# Patient Record
Sex: FEMALE | Race: WHITE | Employment: FULL TIME | URBAN - METROPOLITAN AREA
[De-identification: names, ages, dates, MRNs, and addresses within clinical notes are randomized per-mention and may not be internally consistent; named-entity substitution may affect disease eponyms.]

---

## 2021-03-23 ENCOUNTER — HOSPITAL ENCOUNTER (INPATIENT)
Facility: CLINIC | Age: 26
LOS: 2 days | Discharge: HOME | DRG: 818 | End: 2021-03-26
Attending: EMERGENCY MEDICINE | Admitting: OBSTETRICS & GYNECOLOGY
Payer: COMMERCIAL

## 2021-03-23 DIAGNOSIS — R19.00 PELVIC MASS: ICD-10-CM

## 2021-03-23 PROCEDURE — 96376 TX/PRO/DX INJ SAME DRUG ADON: CPT

## 2021-03-23 PROCEDURE — C9803 HOPD COVID-19 SPEC COLLECT: HCPCS

## 2021-03-23 PROCEDURE — 99285 EMERGENCY DEPT VISIT HI MDM: CPT | Mod: 25

## 2021-03-23 PROCEDURE — 96374 THER/PROPH/DIAG INJ IV PUSH: CPT

## 2021-03-23 PROCEDURE — 96361 HYDRATE IV INFUSION ADD-ON: CPT

## 2021-03-24 ENCOUNTER — ANESTHESIA EVENT (OUTPATIENT)
Dept: SURGERY | Facility: CLINIC | Age: 26
DRG: 818 | End: 2021-03-24
Payer: COMMERCIAL

## 2021-03-24 ENCOUNTER — APPOINTMENT (OUTPATIENT)
Dept: ULTRASOUND IMAGING | Facility: CLINIC | Age: 26
DRG: 818 | End: 2021-03-24
Attending: EMERGENCY MEDICINE
Payer: COMMERCIAL

## 2021-03-24 ENCOUNTER — APPOINTMENT (OUTPATIENT)
Dept: ULTRASOUND IMAGING | Facility: CLINIC | Age: 26
DRG: 818 | End: 2021-03-24
Attending: OBSTETRICS & GYNECOLOGY
Payer: COMMERCIAL

## 2021-03-24 ENCOUNTER — ANESTHESIA (OUTPATIENT)
Dept: SURGERY | Facility: CLINIC | Age: 26
DRG: 818 | End: 2021-03-24
Payer: COMMERCIAL

## 2021-03-24 ENCOUNTER — MEDICAL CORRESPONDENCE (OUTPATIENT)
Dept: HEALTH INFORMATION MANAGEMENT | Facility: CLINIC | Age: 26
End: 2021-03-24

## 2021-03-24 PROBLEM — R19.00 PELVIC MASS: Status: ACTIVE | Noted: 2021-03-24

## 2021-03-24 LAB
ABO + RH BLD: NORMAL
ABO + RH BLD: NORMAL
ALBUMIN SERPL-MCNC: 3.3 G/DL (ref 3.4–5)
ALBUMIN UR-MCNC: NEGATIVE MG/DL
ALP SERPL-CCNC: 49 U/L (ref 40–150)
ALT SERPL W P-5'-P-CCNC: 20 U/L (ref 0–50)
ANION GAP SERPL CALCULATED.3IONS-SCNC: 5 MMOL/L (ref 3–14)
APPEARANCE UR: ABNORMAL
AST SERPL W P-5'-P-CCNC: 10 U/L (ref 0–45)
BACTERIA #/AREA URNS HPF: ABNORMAL /HPF
BASOPHILS # BLD AUTO: 0 10E9/L (ref 0–0.2)
BASOPHILS NFR BLD AUTO: 0.2 %
BILIRUB SERPL-MCNC: 0.7 MG/DL (ref 0.2–1.3)
BILIRUB UR QL STRIP: NEGATIVE
BLD GP AB SCN SERPL QL: NORMAL
BLOOD BANK CMNT PATIENT-IMP: NORMAL
BUN SERPL-MCNC: 5 MG/DL (ref 7–30)
CALCIUM SERPL-MCNC: 8.6 MG/DL (ref 8.5–10.1)
CHLORIDE SERPL-SCNC: 104 MMOL/L (ref 94–109)
CO2 SERPL-SCNC: 23 MMOL/L (ref 20–32)
COLOR UR AUTO: ABNORMAL
CREAT SERPL-MCNC: 0.55 MG/DL (ref 0.52–1.04)
DIFFERENTIAL METHOD BLD: ABNORMAL
EOSINOPHIL # BLD AUTO: 0 10E9/L (ref 0–0.7)
EOSINOPHIL NFR BLD AUTO: 0 %
ERYTHROCYTE [DISTWIDTH] IN BLOOD BY AUTOMATED COUNT: 12.6 % (ref 10–15)
GFR SERPL CREATININE-BSD FRML MDRD: >90 ML/MIN/{1.73_M2}
GLUCOSE SERPL-MCNC: 107 MG/DL (ref 70–99)
GLUCOSE UR STRIP-MCNC: NEGATIVE MG/DL
HCT VFR BLD AUTO: 28.9 % (ref 35–47)
HGB BLD-MCNC: 9.4 G/DL (ref 11.7–15.7)
HGB UR QL STRIP: NEGATIVE
IMM GRANULOCYTES # BLD: 0.1 10E9/L (ref 0–0.4)
IMM GRANULOCYTES NFR BLD: 0.5 %
KETONES UR STRIP-MCNC: 40 MG/DL
LABORATORY COMMENT REPORT: NORMAL
LEUKOCYTE ESTERASE UR QL STRIP: ABNORMAL
LYMPHOCYTES # BLD AUTO: 0.9 10E9/L (ref 0.8–5.3)
LYMPHOCYTES NFR BLD AUTO: 8.1 %
MCH RBC QN AUTO: 28.9 PG (ref 26.5–33)
MCHC RBC AUTO-ENTMCNC: 32.5 G/DL (ref 31.5–36.5)
MCV RBC AUTO: 89 FL (ref 78–100)
MONOCYTES # BLD AUTO: 0.6 10E9/L (ref 0–1.3)
MONOCYTES NFR BLD AUTO: 5.6 %
NEUTROPHILS # BLD AUTO: 9.2 10E9/L (ref 1.6–8.3)
NEUTROPHILS NFR BLD AUTO: 85.6 %
NITRATE UR QL: NEGATIVE
NRBC # BLD AUTO: 0 10*3/UL
NRBC BLD AUTO-RTO: 0 /100
PH UR STRIP: 5.5 PH (ref 5–7)
PLATELET # BLD AUTO: 157 10E9/L (ref 150–450)
POTASSIUM SERPL-SCNC: 3.3 MMOL/L (ref 3.4–5.3)
PROT SERPL-MCNC: 7.1 G/DL (ref 6.8–8.8)
RBC # BLD AUTO: 3.25 10E12/L (ref 3.8–5.2)
RBC #/AREA URNS AUTO: 3 /HPF (ref 0–2)
SARS-COV-2 RNA RESP QL NAA+PROBE: NEGATIVE
SODIUM SERPL-SCNC: 132 MMOL/L (ref 133–144)
SOURCE: ABNORMAL
SP GR UR STRIP: 1 (ref 1–1.03)
SPECIMEN EXP DATE BLD: NORMAL
SPECIMEN SOURCE: NORMAL
SQUAMOUS #/AREA URNS AUTO: 3 /HPF (ref 0–1)
UROBILINOGEN UR STRIP-MCNC: 0 MG/DL (ref 0–2)
WBC # BLD AUTO: 10.8 10E9/L (ref 4–11)
WBC #/AREA URNS AUTO: 10 /HPF (ref 0–5)

## 2021-03-24 PROCEDURE — 0UT00ZZ RESECTION OF RIGHT OVARY, OPEN APPROACH: ICD-10-PCS | Performed by: OBSTETRICS & GYNECOLOGY

## 2021-03-24 PROCEDURE — 85025 COMPLETE CBC W/AUTO DIFF WBC: CPT | Performed by: EMERGENCY MEDICINE

## 2021-03-24 PROCEDURE — 370N000017 HC ANESTHESIA TECHNICAL FEE, PER MIN: Performed by: OBSTETRICS & GYNECOLOGY

## 2021-03-24 PROCEDURE — 250N000009 HC RX 250: Performed by: NURSE ANESTHETIST, CERTIFIED REGISTERED

## 2021-03-24 PROCEDURE — 120N000001 HC R&B MED SURG/OB

## 2021-03-24 PROCEDURE — 76801 OB US < 14 WKS SINGLE FETUS: CPT

## 2021-03-24 PROCEDURE — 250N000025 HC SEVOFLURANE, PER MIN: Performed by: OBSTETRICS & GYNECOLOGY

## 2021-03-24 PROCEDURE — 272N000001 HC OR GENERAL SUPPLY STERILE: Performed by: OBSTETRICS & GYNECOLOGY

## 2021-03-24 PROCEDURE — 250N000011 HC RX IP 250 OP 636: Performed by: OBSTETRICS & GYNECOLOGY

## 2021-03-24 PROCEDURE — 258N000003 HC RX IP 258 OP 636: Performed by: EMERGENCY MEDICINE

## 2021-03-24 PROCEDURE — 250N000011 HC RX IP 250 OP 636: Performed by: NURSE ANESTHETIST, CERTIFIED REGISTERED

## 2021-03-24 PROCEDURE — 80053 COMPREHEN METABOLIC PANEL: CPT | Performed by: EMERGENCY MEDICINE

## 2021-03-24 PROCEDURE — 88305 TISSUE EXAM BY PATHOLOGIST: CPT | Mod: 26 | Performed by: PATHOLOGY

## 2021-03-24 PROCEDURE — 76705 ECHO EXAM OF ABDOMEN: CPT

## 2021-03-24 PROCEDURE — 88305 TISSUE EXAM BY PATHOLOGIST: CPT | Mod: TC | Performed by: OBSTETRICS & GYNECOLOGY

## 2021-03-24 PROCEDURE — 250N000009 HC RX 250: Performed by: OBSTETRICS & GYNECOLOGY

## 2021-03-24 PROCEDURE — 258N000003 HC RX IP 258 OP 636: Performed by: NURSE ANESTHETIST, CERTIFIED REGISTERED

## 2021-03-24 PROCEDURE — 250N000011 HC RX IP 250 OP 636: Performed by: ANESTHESIOLOGY

## 2021-03-24 PROCEDURE — 86900 BLOOD TYPING SEROLOGIC ABO: CPT | Performed by: ANESTHESIOLOGY

## 2021-03-24 PROCEDURE — 0UT50ZZ RESECTION OF RIGHT FALLOPIAN TUBE, OPEN APPROACH: ICD-10-PCS | Performed by: OBSTETRICS & GYNECOLOGY

## 2021-03-24 PROCEDURE — 710N000009 HC RECOVERY PHASE 1, LEVEL 1, PER MIN: Performed by: OBSTETRICS & GYNECOLOGY

## 2021-03-24 PROCEDURE — 87635 SARS-COV-2 COVID-19 AMP PRB: CPT | Performed by: EMERGENCY MEDICINE

## 2021-03-24 PROCEDURE — 250N000013 HC RX MED GY IP 250 OP 250 PS 637: Performed by: OBSTETRICS & GYNECOLOGY

## 2021-03-24 PROCEDURE — 250N000011 HC RX IP 250 OP 636: Performed by: EMERGENCY MEDICINE

## 2021-03-24 PROCEDURE — 86850 RBC ANTIBODY SCREEN: CPT | Performed by: ANESTHESIOLOGY

## 2021-03-24 PROCEDURE — 360N000076 HC SURGERY LEVEL 3, PER MIN: Performed by: OBSTETRICS & GYNECOLOGY

## 2021-03-24 PROCEDURE — 999N000141 HC STATISTIC PRE-PROCEDURE NURSING ASSESSMENT: Performed by: OBSTETRICS & GYNECOLOGY

## 2021-03-24 PROCEDURE — 81001 URINALYSIS AUTO W/SCOPE: CPT | Performed by: EMERGENCY MEDICINE

## 2021-03-24 PROCEDURE — 86901 BLOOD TYPING SEROLOGIC RH(D): CPT | Performed by: ANESTHESIOLOGY

## 2021-03-24 PROCEDURE — 76805 OB US >/= 14 WKS SNGL FETUS: CPT

## 2021-03-24 RX ORDER — ONDANSETRON 2 MG/ML
4 INJECTION INTRAMUSCULAR; INTRAVENOUS EVERY 30 MIN PRN
Status: DISCONTINUED | OUTPATIENT
Start: 2021-03-24 | End: 2021-03-24 | Stop reason: HOSPADM

## 2021-03-24 RX ORDER — KETOROLAC TROMETHAMINE 15 MG/ML
15 INJECTION, SOLUTION INTRAMUSCULAR; INTRAVENOUS EVERY 6 HOURS
Status: ACTIVE | OUTPATIENT
Start: 2021-03-24 | End: 2021-03-25

## 2021-03-24 RX ORDER — CEPHALEXIN 500 MG/1
500 CAPSULE ORAL 2 TIMES DAILY
COMMUNITY
Start: 2021-03-21 | End: 2021-03-27

## 2021-03-24 RX ORDER — KETOROLAC TROMETHAMINE 15 MG/ML
15 INJECTION, SOLUTION INTRAMUSCULAR; INTRAVENOUS EVERY 6 HOURS
Status: DISCONTINUED | OUTPATIENT
Start: 2021-03-24 | End: 2021-03-24

## 2021-03-24 RX ORDER — ONDANSETRON 2 MG/ML
INJECTION INTRAMUSCULAR; INTRAVENOUS PRN
Status: DISCONTINUED | OUTPATIENT
Start: 2021-03-24 | End: 2021-03-24

## 2021-03-24 RX ORDER — ONDANSETRON 2 MG/ML
4 INJECTION INTRAMUSCULAR; INTRAVENOUS EVERY 6 HOURS PRN
Status: DISCONTINUED | OUTPATIENT
Start: 2021-03-24 | End: 2021-03-26 | Stop reason: HOSPADM

## 2021-03-24 RX ORDER — IBUPROFEN 400 MG/1
800 TABLET, FILM COATED ORAL EVERY 8 HOURS PRN
Status: DISCONTINUED | OUTPATIENT
Start: 2021-03-25 | End: 2021-03-26 | Stop reason: HOSPADM

## 2021-03-24 RX ORDER — IBUPROFEN 400 MG/1
800 TABLET, FILM COATED ORAL
Status: DISCONTINUED | OUTPATIENT
Start: 2021-03-24 | End: 2021-03-24

## 2021-03-24 RX ORDER — HYDROXYZINE HYDROCHLORIDE 25 MG/1
25 TABLET, FILM COATED ORAL EVERY 6 HOURS PRN
Status: DISCONTINUED | OUTPATIENT
Start: 2021-03-24 | End: 2021-03-26 | Stop reason: HOSPADM

## 2021-03-24 RX ORDER — ONDANSETRON 4 MG/1
4 TABLET, ORALLY DISINTEGRATING ORAL EVERY 6 HOURS PRN
Status: DISCONTINUED | OUTPATIENT
Start: 2021-03-24 | End: 2021-03-26 | Stop reason: HOSPADM

## 2021-03-24 RX ORDER — SODIUM CHLORIDE, SODIUM LACTATE, POTASSIUM CHLORIDE, CALCIUM CHLORIDE 600; 310; 30; 20 MG/100ML; MG/100ML; MG/100ML; MG/100ML
INJECTION, SOLUTION INTRAVENOUS CONTINUOUS PRN
Status: DISCONTINUED | OUTPATIENT
Start: 2021-03-24 | End: 2021-03-24

## 2021-03-24 RX ORDER — ONDANSETRON 4 MG/1
4 TABLET, ORALLY DISINTEGRATING ORAL EVERY 30 MIN PRN
Status: DISCONTINUED | OUTPATIENT
Start: 2021-03-24 | End: 2021-03-24 | Stop reason: HOSPADM

## 2021-03-24 RX ORDER — FENTANYL CITRATE 50 UG/ML
25-100 INJECTION, SOLUTION INTRAMUSCULAR; INTRAVENOUS
Status: DISCONTINUED | OUTPATIENT
Start: 2021-03-24 | End: 2021-03-24 | Stop reason: HOSPADM

## 2021-03-24 RX ORDER — HYDROMORPHONE HYDROCHLORIDE 1 MG/ML
.3-.5 INJECTION, SOLUTION INTRAMUSCULAR; INTRAVENOUS; SUBCUTANEOUS EVERY 5 MIN PRN
Status: DISCONTINUED | OUTPATIENT
Start: 2021-03-24 | End: 2021-03-24 | Stop reason: HOSPADM

## 2021-03-24 RX ORDER — LIDOCAINE 40 MG/G
CREAM TOPICAL
Status: DISCONTINUED | OUTPATIENT
Start: 2021-03-24 | End: 2021-03-26 | Stop reason: HOSPADM

## 2021-03-24 RX ORDER — SODIUM CHLORIDE 9 MG/ML
INJECTION, SOLUTION INTRAVENOUS CONTINUOUS
Status: DISCONTINUED | OUTPATIENT
Start: 2021-03-24 | End: 2021-03-26 | Stop reason: HOSPADM

## 2021-03-24 RX ORDER — HYDROMORPHONE HCL IN WATER/PF 6 MG/30 ML
0.2 PATIENT CONTROLLED ANALGESIA SYRINGE INTRAVENOUS
Status: DISCONTINUED | OUTPATIENT
Start: 2021-03-24 | End: 2021-03-26 | Stop reason: HOSPADM

## 2021-03-24 RX ORDER — BISACODYL 10 MG
10 SUPPOSITORY, RECTAL RECTAL DAILY PRN
Status: DISCONTINUED | OUTPATIENT
Start: 2021-03-24 | End: 2021-03-26 | Stop reason: HOSPADM

## 2021-03-24 RX ORDER — MORPHINE SULFATE 4 MG/ML
4 INJECTION, SOLUTION INTRAMUSCULAR; INTRAVENOUS ONCE
Status: COMPLETED | OUTPATIENT
Start: 2021-03-24 | End: 2021-03-24

## 2021-03-24 RX ORDER — NALOXONE HYDROCHLORIDE 0.4 MG/ML
0.2 INJECTION, SOLUTION INTRAMUSCULAR; INTRAVENOUS; SUBCUTANEOUS
Status: ACTIVE | OUTPATIENT
Start: 2021-03-24 | End: 2021-03-25

## 2021-03-24 RX ORDER — HYDROMORPHONE HYDROCHLORIDE 1 MG/ML
0.4 INJECTION, SOLUTION INTRAMUSCULAR; INTRAVENOUS; SUBCUTANEOUS
Status: DISCONTINUED | OUTPATIENT
Start: 2021-03-24 | End: 2021-03-26 | Stop reason: HOSPADM

## 2021-03-24 RX ORDER — GLYCOPYRROLATE 0.2 MG/ML
INJECTION, SOLUTION INTRAMUSCULAR; INTRAVENOUS PRN
Status: DISCONTINUED | OUTPATIENT
Start: 2021-03-24 | End: 2021-03-24

## 2021-03-24 RX ORDER — ACETAMINOPHEN 325 MG/1
975 TABLET ORAL EVERY 6 HOURS
Status: DISCONTINUED | OUTPATIENT
Start: 2021-03-24 | End: 2021-03-26 | Stop reason: HOSPADM

## 2021-03-24 RX ORDER — IBUPROFEN 400 MG/1
800 TABLET, FILM COATED ORAL EVERY 6 HOURS
Status: DISCONTINUED | OUTPATIENT
Start: 2021-03-24 | End: 2021-03-24

## 2021-03-24 RX ORDER — NALOXONE HYDROCHLORIDE 0.4 MG/ML
0.4 INJECTION, SOLUTION INTRAMUSCULAR; INTRAVENOUS; SUBCUTANEOUS
Status: ACTIVE | OUTPATIENT
Start: 2021-03-24 | End: 2021-03-25

## 2021-03-24 RX ORDER — NEOSTIGMINE METHYLSULFATE 1 MG/ML
VIAL (ML) INJECTION PRN
Status: DISCONTINUED | OUTPATIENT
Start: 2021-03-24 | End: 2021-03-24

## 2021-03-24 RX ORDER — AMOXICILLIN 250 MG
1 CAPSULE ORAL 2 TIMES DAILY
Status: DISCONTINUED | OUTPATIENT
Start: 2021-03-24 | End: 2021-03-26 | Stop reason: HOSPADM

## 2021-03-24 RX ORDER — CEFAZOLIN SODIUM 2 G/100ML
INJECTION, SOLUTION INTRAVENOUS PRN
Status: DISCONTINUED | OUTPATIENT
Start: 2021-03-24 | End: 2021-03-24

## 2021-03-24 RX ORDER — ACETAMINOPHEN 325 MG/1
650 TABLET ORAL EVERY 6 HOURS
Status: DISCONTINUED | OUTPATIENT
Start: 2021-03-27 | End: 2021-03-26 | Stop reason: HOSPADM

## 2021-03-24 RX ORDER — PROPOFOL 10 MG/ML
INJECTION, EMULSION INTRAVENOUS PRN
Status: DISCONTINUED | OUTPATIENT
Start: 2021-03-24 | End: 2021-03-24

## 2021-03-24 RX ORDER — SODIUM CHLORIDE, SODIUM LACTATE, POTASSIUM CHLORIDE, CALCIUM CHLORIDE 600; 310; 30; 20 MG/100ML; MG/100ML; MG/100ML; MG/100ML
INJECTION, SOLUTION INTRAVENOUS CONTINUOUS
Status: DISCONTINUED | OUTPATIENT
Start: 2021-03-24 | End: 2021-03-24 | Stop reason: HOSPADM

## 2021-03-24 RX ORDER — FENTANYL CITRATE 50 UG/ML
INJECTION, SOLUTION INTRAMUSCULAR; INTRAVENOUS PRN
Status: DISCONTINUED | OUTPATIENT
Start: 2021-03-24 | End: 2021-03-24

## 2021-03-24 RX ORDER — OXYCODONE HYDROCHLORIDE 5 MG/1
5 TABLET ORAL EVERY 4 HOURS PRN
Status: DISCONTINUED | OUTPATIENT
Start: 2021-03-24 | End: 2021-03-26 | Stop reason: HOSPADM

## 2021-03-24 RX ORDER — MAGNESIUM HYDROXIDE 1200 MG/15ML
LIQUID ORAL PRN
Status: DISCONTINUED | OUTPATIENT
Start: 2021-03-24 | End: 2021-03-24 | Stop reason: HOSPADM

## 2021-03-24 RX ORDER — FENTANYL CITRATE 50 UG/ML
25-50 INJECTION, SOLUTION INTRAMUSCULAR; INTRAVENOUS
Status: DISCONTINUED | OUTPATIENT
Start: 2021-03-24 | End: 2021-03-24 | Stop reason: HOSPADM

## 2021-03-24 RX ORDER — PROCHLORPERAZINE MALEATE 10 MG
10 TABLET ORAL EVERY 6 HOURS PRN
Status: DISCONTINUED | OUTPATIENT
Start: 2021-03-24 | End: 2021-03-26 | Stop reason: HOSPADM

## 2021-03-24 RX ORDER — OXYCODONE HYDROCHLORIDE 5 MG/1
10 TABLET ORAL EVERY 4 HOURS PRN
Status: DISCONTINUED | OUTPATIENT
Start: 2021-03-24 | End: 2021-03-26 | Stop reason: HOSPADM

## 2021-03-24 RX ORDER — KETOROLAC TROMETHAMINE 15 MG/ML
15 INJECTION, SOLUTION INTRAMUSCULAR; INTRAVENOUS
Status: DISCONTINUED | OUTPATIENT
Start: 2021-03-24 | End: 2021-03-24

## 2021-03-24 RX ORDER — PROPOFOL 10 MG/ML
INJECTION, EMULSION INTRAVENOUS CONTINUOUS PRN
Status: DISCONTINUED | OUTPATIENT
Start: 2021-03-24 | End: 2021-03-24

## 2021-03-24 RX ADMIN — ROCURONIUM BROMIDE 10 MG: 10 INJECTION INTRAVENOUS at 06:46

## 2021-03-24 RX ADMIN — MORPHINE SULFATE 4 MG: 4 INJECTION, SOLUTION INTRAMUSCULAR; INTRAVENOUS at 00:43

## 2021-03-24 RX ADMIN — OXYCODONE HYDROCHLORIDE 5 MG: 5 TABLET ORAL at 16:08

## 2021-03-24 RX ADMIN — PHENYLEPHRINE HYDROCHLORIDE 100 MCG: 10 INJECTION INTRAVENOUS at 06:22

## 2021-03-24 RX ADMIN — SODIUM CHLORIDE, POTASSIUM CHLORIDE, SODIUM LACTATE AND CALCIUM CHLORIDE: 600; 310; 30; 20 INJECTION, SOLUTION INTRAVENOUS at 06:32

## 2021-03-24 RX ADMIN — ACETAMINOPHEN 975 MG: 325 TABLET, FILM COATED ORAL at 20:13

## 2021-03-24 RX ADMIN — FENTANYL CITRATE 50 MCG: 50 INJECTION, SOLUTION INTRAMUSCULAR; INTRAVENOUS at 06:24

## 2021-03-24 RX ADMIN — HYDROMORPHONE HYDROCHLORIDE 0.5 MG: 1 INJECTION, SOLUTION INTRAMUSCULAR; INTRAVENOUS; SUBCUTANEOUS at 07:51

## 2021-03-24 RX ADMIN — HYDROMORPHONE HYDROCHLORIDE 0.2 MG: 0.2 INJECTION, SOLUTION INTRAMUSCULAR; INTRAVENOUS; SUBCUTANEOUS at 18:33

## 2021-03-24 RX ADMIN — PHENYLEPHRINE HYDROCHLORIDE 100 MCG: 10 INJECTION INTRAVENOUS at 06:16

## 2021-03-24 RX ADMIN — FENTANYL CITRATE 50 MCG: 0.05 INJECTION, SOLUTION INTRAMUSCULAR; INTRAVENOUS at 07:40

## 2021-03-24 RX ADMIN — FENTANYL CITRATE 50 MCG: 50 INJECTION, SOLUTION INTRAMUSCULAR; INTRAVENOUS at 06:00

## 2021-03-24 RX ADMIN — SUCCINYLCHOLINE CHLORIDE 100 MG: 20 INJECTION, SOLUTION INTRAMUSCULAR; INTRAVENOUS; PARENTERAL at 06:00

## 2021-03-24 RX ADMIN — SODIUM CHLORIDE 1000 ML: 9 INJECTION, SOLUTION INTRAVENOUS at 00:17

## 2021-03-24 RX ADMIN — SENNOSIDES AND DOCUSATE SODIUM 1 TABLET: 8.6; 5 TABLET ORAL at 20:13

## 2021-03-24 RX ADMIN — CEFAZOLIN SODIUM 2 G: 2 INJECTION, SOLUTION INTRAVENOUS at 06:06

## 2021-03-24 RX ADMIN — PHENYLEPHRINE HYDROCHLORIDE 100 MCG: 10 INJECTION INTRAVENOUS at 06:03

## 2021-03-24 RX ADMIN — PHENYLEPHRINE HYDROCHLORIDE 100 MCG: 10 INJECTION INTRAVENOUS at 06:43

## 2021-03-24 RX ADMIN — HYDROMORPHONE HYDROCHLORIDE 0.5 MG: 1 INJECTION, SOLUTION INTRAMUSCULAR; INTRAVENOUS; SUBCUTANEOUS at 08:05

## 2021-03-24 RX ADMIN — SODIUM CHLORIDE, POTASSIUM CHLORIDE, SODIUM LACTATE AND CALCIUM CHLORIDE: 600; 310; 30; 20 INJECTION, SOLUTION INTRAVENOUS at 05:54

## 2021-03-24 RX ADMIN — SENNOSIDES AND DOCUSATE SODIUM 1 TABLET: 8.6; 5 TABLET ORAL at 10:32

## 2021-03-24 RX ADMIN — GLYCOPYRROLATE 0.8 MG: 0.2 INJECTION, SOLUTION INTRAMUSCULAR; INTRAVENOUS at 07:11

## 2021-03-24 RX ADMIN — ROCURONIUM BROMIDE 30 MG: 10 INJECTION INTRAVENOUS at 06:05

## 2021-03-24 RX ADMIN — HYDROMORPHONE HYDROCHLORIDE 0.4 MG: 0.2 INJECTION, SOLUTION INTRAMUSCULAR; INTRAVENOUS; SUBCUTANEOUS at 23:52

## 2021-03-24 RX ADMIN — OXYCODONE HYDROCHLORIDE 5 MG: 5 TABLET ORAL at 21:16

## 2021-03-24 RX ADMIN — MORPHINE SULFATE 4 MG: 4 INJECTION, SOLUTION INTRAMUSCULAR; INTRAVENOUS at 02:06

## 2021-03-24 RX ADMIN — PROPOFOL 100 MCG/KG/MIN: 10 INJECTION, EMULSION INTRAVENOUS at 06:03

## 2021-03-24 RX ADMIN — ONDANSETRON 4 MG: 2 INJECTION INTRAMUSCULAR; INTRAVENOUS at 07:15

## 2021-03-24 RX ADMIN — NEOSTIGMINE METHYLSULFATE 4 MG: 1 INJECTION, SOLUTION INTRAVENOUS at 07:11

## 2021-03-24 RX ADMIN — OXYCODONE HYDROCHLORIDE 5 MG: 5 TABLET ORAL at 10:32

## 2021-03-24 RX ADMIN — ACETAMINOPHEN 975 MG: 325 TABLET, FILM COATED ORAL at 13:33

## 2021-03-24 RX ADMIN — PROPOFOL 180 MG: 10 INJECTION, EMULSION INTRAVENOUS at 06:00

## 2021-03-24 ASSESSMENT — ENCOUNTER SYMPTOMS
VOMITING: 0
FEVER: 0
FLANK PAIN: 0
DIARRHEA: 0
CHILLS: 0
DYSURIA: 0
NAUSEA: 0
ABDOMINAL PAIN: 1

## 2021-03-24 ASSESSMENT — ACTIVITIES OF DAILY LIVING (ADL)
ADLS_ACUITY_SCORE: 16

## 2021-03-24 NOTE — ANESTHESIA PROCEDURE NOTES
Airway       Patient location during procedure: OR  Staff -        CRNA: Jodi Rehman APRN CRNA       Performed By: CRNA  Consent for Airway        Urgency: elective  Indications and Patient Condition       Indications for airway management: garcia-procedural       Induction type:RSI       Mask difficulty assessment: 0 - not attempted    Final Airway Details       Final airway type: endotracheal airway       Successful airway: ETT - single  Endotracheal Airway Details        ETT size (mm): 7.0       Cuffed: yes       Successful intubation technique: video laryngoscopy       VL Blade Size: Glidescope 4       Grade View of Cords: 1       Adjucts: stylet       Position: Right       Measured from: lips       Secured at (cm): 22       Bite block used: Soft    Post intubation assessment        Placement verified by: capnometry, equal breath sounds and chest rise        Number of attempts at approach: 1       Secured with: pink tape       Ease of procedure: easy       Dentition: Intact and Unchanged    Medication(s) Administered   Medication Administration Time: 3/24/2021 6:01 AM

## 2021-03-24 NOTE — ANESTHESIA PROCEDURE NOTES
"TAP Procedure Note  Pre-Procedure   Staff -        Anesthesiologist:  iLsa Cosby MD       Performed By: anesthesiologist       Location: OR       Pre-Anesthestic Checklist: patient identified, IV checked, site marked, risks and benefits discussed, informed consent, monitors and equipment checked, pre-op evaluation, at physician/surgeon's request and post-op pain management  Timeout:       Correct Patient: Yes        Correct Procedure: Yes        Correct Site: Yes        Correct Position: Yes        Correct Laterality: Yes        Site Marked: Yes  Procedure Documentation  Procedure: TAP       Laterality: bilateral       Patient Position: supine       Patient Prep/Sterile Barriers: sterile gloves, mask, \"no-touch\" technique        Skin prep: Chloraprep      Local skin infiltrated with 3 mL of.        Needle Type: insulated and short bevel (Pajunk)       Needle Gauge: 21.        Needle Length (millimeters): 100        - Ultrasound guided       - Ultrasound used to identify targeted nerve, plexus, vascular marker, or fascial plane and place a needle adjacent to it in real-time       - Ultrasound was used to visualize the spread of anesthetic in close proximity to the above referenced structure       - A permanent image is entered into the patient's record.    Assessment/Narrative         The placement was negative for: blood aspirated, painful injection and site bleeding       Paresthesias: No.    Test dose of mL at.         Test dose negative, 3 minutes after injection, for signs of intravascular, subdural, or intrathecal injection.     Bolus given via needle..        Secured via.        Insertion/Infusion Method: Single Shot       Complications: none       Injection made incrementally with aspirations every 5 mL.    Comments:  Bilateral TAP block was done for post operative pain management. 0.5% Bupivacaine with 1:400,000 epinephrine injected.  Patient tolerated the procedure well.    The surgeon has given a " verbal order transferring care of this patient to me for the performance of a regional analgesia block for post-op pain control. It is requested of me because I am uniquely trained and qualified to perform this block and the surgeon is neither trained nor qualified to perform this procedure.

## 2021-03-24 NOTE — ED NOTES
Patient presents to ED with reports of worsening RLQ abdominal pain over the last week. Pt seen on Sunday at outside facility in [place] for same issue and was diagnosed with UTI. Patient approx 14 weeks pregnant. She was started on ABX on Sunday for UTI. Pain has worsened in RLQ, patient denies any vaginal bleeding. Intrauterine pregnancy determined on Sunday via ultrasound. Patient had ultrasound of appendix as well but the US was unable to visualize appendix. Pt still having some pain with urination. Patient has paperwork with her from her visit on Sunday.

## 2021-03-24 NOTE — H&P
[facility] Labor and Delivery History and Physical    Jesus Garcia MRN# [id number]   Age: 26 year old YOB: 1995     Date of Admission:  3/23/2021    Primary care provider: No Ref-Primary, Physician           Chief Complaint:   Jesus Garcia is a 26 year old female who is 13w0d pregnant and being admitted for acute pelvic pain associated with a 18 cm adnexal mass. She is here visiting from [place] for passover. She grew up in [place], is now  and has moved to [place]. She has had 2 early Ob ultrasounds there with no comments of pelvic mass. She was feeling progressively worse with increasing pain over the past 4-5 days. She had an ultrasound due to the pain before she left town but the pain continued and worsened. She presented to the ER at [facility] and an ultrasound was performed which showed a [id number] cm solid echogenic mass in the right pelvis. Her pain has required 8 mg morphine at this point and her pain is still a 8/10.          Pregnancy history:     OBSTETRIC HISTORY:    OB History    Para Term  AB Living   1 0 0 0 0 0   SAB TAB Ectopic Multiple Live Births   0 0 0 0 0      # Outcome Date GA Lbr Franko/2nd Weight Sex Delivery Anes PTL Lv   1 Current                EDC: Estimated Date of Delivery: Sep 29, 2021    Prenatal Labs:   Lab Results   Component Value Date    ABO O 2021    RH Neg 2021    AS Neg 2021    HGB 9.4 (L) 2021       GBS Status:   No results found for: GBS    Active Problem List  Patient Active Problem List   Diagnosis     Pelvic mass       Medication Prior to Admission  Medications Prior to Admission   Medication Sig Dispense Refill Last Dose     cephALEXin (KEFLEX) 500 MG capsule Take 500 mg by mouth 2 times daily For 7 days starting 3/21/21   3/23/2021 at Unknown time   .        Maternal Past Medical History:   History reviewed. No pertinent past medical history.                    Family History:   This patient has  no significant family history            Social History:     Social History     Tobacco Use     Smoking status: Not on file   Substance Use Topics     Alcohol use: Not on file            Review of Systems:   C: NEGATIVE for fever, chills, change in weight  E/M: NEGATIVE for ear, mouth and throat problems  R: NEGATIVE for significant cough or SOB  CV: NEGATIVE for chest pain, palpitations or peripheral edema          Physical Exam:   Vitals were reviewed    Constitutional: Awake, alert, cooperative, no apparent distress, and appears stated age.  Eyes: Lids and lashes normal, pupils equal, round and reactive to light, extra ocular muscles intact, sclera clear, conjunctiva normal.  ENT: Normocephalic, without obvious abnormality, atramatic, sinuses nontender on palpation, external ears without lesions, oral pharynx with moist mucus membranes, tonsils without erythema or exudates, gums normal and good dentition.  Neck: Supple, symmetrical, trachea midline, no adenopathy, thyroid symmetric, not enlarged and no tenderness, skin normal.  Back: Symmetric, no curvature, spinous processes are non-tender on palpation, paraspinous muscles are non-tender on palpation, no costal vertebral tenderness.  Lungs: No increased work of breathing, good air exchange.  Cardiovascular: Regular rate and rhythm.  Abdomen: No scars, normal bowel sounds, soft, non-distended, non-tender, no masses palpated, no hepatosplenomegally.  Musculoskeletal: No redness, warmth, or swelling of the joints.  Full range of motion noted.  Motor strength is 5 out of 5 all extremities bilaterally.  Tone is normal.  Neurologic: Awake, alert, oriented to name, place and time.  Cranial nerves II-XII are grossly intact.   heel to shin intact.  Sensory is intact.  Babinski down Neuropsychiatric: Normal affect, mood, orientation, memory and insight.  Skin: No rashes, erythema, pallor, petechia or purpura.     Fetal Heart Rate Tracing: present                        Assessment:   Jesus Garcia is a 13w0d pregnant female admitted with acute pelvic pain associated with a [id number] cm pelvic mass. This mass looks echogenic and suggests an ovarian dermoid. She is having ongoing nonmanageable pelvic pain. She has a normal viable IUP. This is most likely torsing or has torsed by now. She needs to have emergency surgery at this point. The size of the mass with dictate requiring a vertical laparotomy for the excision of this mass. I discussed this with her and her  that she will most likely require a right salpingoophorectomy. She agrees and consents for surgery. The time for reviewing information, performing exam, and having discussions with her and family were 90 minutes.          Plan:   Laparotomy with excision of pelvic mass    Emmanuel Soto MD

## 2021-03-24 NOTE — PLAN OF CARE
Labor and delivery RN went to PACU to perform doptones.  Unable to find FHR. Dr Soto notified. Orders received for bedside sono for tones

## 2021-03-24 NOTE — ANESTHESIA POSTPROCEDURE EVALUATION
Patient: Jesus Garcia    Procedure(s):  LAPAROTOMY WITH Right salpingoophorectomy    Diagnosis:Ovarian torsion [N83.519]  Diagnosis Additional Information: No value filed.    Anesthesia Type:  General    Note:  Disposition: Inpatient   Postop Pain Control: Uneventful            Sign Out: Well controlled pain   PONV: No   Neuro/Psych: Uneventful            Sign Out: Acceptable/Baseline neuro status   Airway/Respiratory: Uneventful            Sign Out: Acceptable/Baseline resp. status   CV/Hemodynamics: Uneventful            Sign Out: Acceptable CV status   Other NRE: NONE   DID A NON-ROUTINE EVENT OCCUR? No         Last vitals:  Vitals:    03/24/21 0810 03/24/21 0820 03/24/21 0840   BP: 99/62 101/59 105/63   Pulse: 91 87 98   Resp: 18 9 13   Temp: 38  C (100.4  F) 38  C (100.4  F) 37.9  C (100.2  F)   SpO2: 98% 98% 95%       Last vitals prior to Anesthesia Care Transfer:  CRNA VITALS  3/24/2021 0700 - 3/24/2021 0800      3/24/2021             Pulse:  116    SpO2:  100 %    Resp Rate (set):  10          Electronically Signed By: Lisa Cosby MD  March 24, 2021  8:49 AM

## 2021-03-24 NOTE — ANESTHESIA CARE TRANSFER NOTE
Patient: Jesus Garcia    Procedure(s):  LAPAROTOMY WITH Right salpingoophorectomy    Diagnosis: Ovarian torsion [N83.519]  Diagnosis Additional Information: No value filed.    Anesthesia Type:   General     Note:    Oropharynx: oropharynx clear of all foreign objects and spontaneously breathing  Level of Consciousness: awake and drowsy  Oxygen Supplementation: face mask  Level of Supplemental Oxygen (L/min / FiO2): 6  Independent Airway: airway patency satisfactory and stable  Dentition: dentition unchanged  Vital Signs Stable: post-procedure vital signs reviewed and stable  Report to RN Given: handoff report given  Patient transferred to: PACU  Comments: Neuromuscular blockade reversed after TOF 3/4, spontaneous respirations, adequate tidal volumes, followed commands to voice, oropharynx suctioned with soft flexible catheter, extubated atraumatically, extubated with suction, airway patent after extubation.  Oxygen via facemask at 6 liters per minute to PACU. Oxygen tubing connected to wall O2 in PACU, SpO2, NiBP, and EKG monitors and alarms on and functioning, Rebeca Hugger warmer connected to patient gown, report on patient's clinical status given to PACU RN, RN questions answered.     Handoff Report: Identifed the Patient, Identified the Reponsible Provider, Reviewed the pertinent medical history, Discussed the surgical course, Reviewed Intra-OP anesthesia mangement and issues during anesthesia, Set expectations for post-procedure period and Allowed opportunity for questions and acknowledgement of understanding      Vitals: (Last set prior to Anesthesia Care Transfer)  CRNA VITALS  3/24/2021 0700 - 3/24/2021 0737      3/24/2021             Pulse:  116    SpO2:  100 %    Resp Rate (set):  10        Electronically Signed By: Claudette Rowell  March 24, 2021  7:37 AM

## 2021-03-24 NOTE — PROGRESS NOTES
Pharmacy Medication History  Admission medication history interview status for the 3/23/2021  admission is complete. See EPIC admission navigator for prior to admission medications     Location of Interview: Patient room  Medication history sources: Patient, Patient's family/friend (spouse) and Surescripts    Significant changes made to the medication list:    Added 7d course of Keflex.    In the past week, patient estimated taking medication this percent of the time: NA    Additional medication history information:     Pt started a 7d course of Keflex on 3/21/21.    Medication reconciliation completed by provider prior to medication history? No    Time spent in this activity: 5 mins    Prior to Admission medications    Medication Sig Last Dose Taking? Auth Provider   cephALEXin (KEFLEX) 500 MG capsule Take 500 mg by mouth 2 times daily For 7 days starting 3/21/21 3/23/2021 at Unknown time Yes Unknown, Entered By History       The information provided in this note is only as accurate as the sources available at the time of update(s)

## 2021-03-24 NOTE — ANESTHESIA PREPROCEDURE EVALUATION
Anesthesia Pre-Procedure Evaluation    Patient: Jesus Garcia   MRN: [id number] : 1995        Preoperative Diagnosis: Ovarian torsion [N83.519]   Procedure : Procedure(s):  LAPAROTOMY, EXCISION OF PELVIC MASS, POSSIBLE RIGHT OOPHORECTOMY     History reviewed. No pertinent past medical history.   History reviewed. No pertinent surgical history.   No Known Allergies   Social History     Tobacco Use     Smoking status: Not on file   Substance Use Topics     Alcohol use: Not on file      Wt Readings from Last 1 Encounters:   21 86.2 kg (190 lb)        Anesthesia Evaluation   Pt has had prior anesthetic.     No history of anesthetic complications       ROS/MED HX  ENT/Pulmonary:    (-) sleep apnea   Neurologic:  - neg neurologic ROS     Cardiovascular:  - neg cardiovascular ROS  (-) hypertension   METS/Exercise Tolerance:     Hematologic:       Musculoskeletal:       GI/Hepatic:    (-) GERD   Renal/Genitourinary: Comment: Ovarian torsion      Endo:  - neg endo ROS     Psychiatric/Substance Use:       Infectious Disease:       Malignancy:       Other:            Physical Exam    Airway        Mallampati: III   TM distance: > 3 FB   Neck ROM: full   Mouth opening: > 3 cm    Respiratory Devices and Support         Dental  no notable dental history         Cardiovascular   cardiovascular exam normal          Pulmonary   pulmonary exam normal                OUTSIDE LABS:  CBC:   Lab Results   Component Value Date    WBC 10.8 2021    HGB 9.4 (L) 2021    HCT 28.9 (L) 2021     2021     BMP:   Lab Results   Component Value Date     (L) 2021    POTASSIUM 3.3 (L) 2021    CHLORIDE 104 2021    CO2 23 2021    BUN 5 (L) 2021    CR 0.55 2021     (H) 2021     COAGS: No results found for: PTT, INR, FIBR  POC: No results found for: BGM, HCG, HCGS  HEPATIC:   Lab Results   Component Value Date    ALBUMIN 3.3 (L) 2021    PROTTOTAL 7.1  03/24/2021    ALT 20 03/24/2021    AST 10 03/24/2021    ALKPHOS 49 03/24/2021    BILITOTAL 0.7 03/24/2021     OTHER:   Lab Results   Component Value Date    BRIDGETTE 8.6 03/24/2021       Anesthesia Plan    ASA Status:  2, emergent    NPO Status:  NPO Appropriate    Anesthesia Type: General.     - Airway: ETT   Induction: RSI, Intravenous.   Maintenance: Balanced.   Techniques and Equipment:     - Airway: Video-Laryngoscope         Consents    Anesthesia Plan(s) and associated risks, benefits, and realistic alternatives discussed. Questions answered and patient/representative(s) expressed understanding.     - Discussed with:  Patient         Postoperative Care    Pain management: IV analgesics, Multi-modal analgesia, Peripheral nerve block (Single Shot).        Comments:    I discussed in detail her options of a GA vs spinal anesthetic.  Her and her  did not want a spinal anesthetic, despite me encouraging it.    RSI with ETT and glidescope  IV hydration  Maintain MAP > 75    Minimize medications used to only those necessary.  No versed No nitrous    TAP block for post operative pain discussed. Patient was agreeable for TAP block.    Discussed TAP block postop with the patient and the surgeon, and both would like this performed            Virgilio Nixon MD

## 2021-03-24 NOTE — ED PROVIDER NOTES
History   Chief Complaint:  Abdominal pain      HPI   Jesus Garcia is a 26 year old  female at approximately 14 weeks gestation who presents with abdominal pain.  The patient reports waking up with right lower quadrant abdominal pain 3 days ago.  She was seen at urgent care in [place] and was diagnosed with a UTI.  She was started on Keflex and has also been taking Tylenol, 2 extra strength tablets every 5 - 6 hours.  Despite this, she continues to have constant sharp right lower quadrant pain.  She did vomit once at the onset of pain but has not had any ongoing nausea or vomiting.  She has had a ultrasound that confirmed an intrauterine pregnancy.  ultrasound to look for appendicitis was non-diagnostic as it did not identify the appendix.  She feels generally weak.  She denies any pain with urination, vaginal bleeding, fever or chills.  She had her second COVID vaccine yesterday before travelling here by plane.    Results from 3/21/21:  CBC: WBC 10.2, HGB 12.4,    BMP: Na 135 (L), CO2 23 (L), Glucose 110 (H), Creatinine 0.41 (L), otherwise WNL (Creatinine 0.41)   HCG, Quantitative: 62472  INR: 1.06  PTT: 30   ABO/Rh type: O negative   UA: Cloudy pernell colored urine, Ketones 5, Protein 30, Nitrites positive, Leukocyte Esterase trace, Ascorbic Acid 40, WBC/HPF 3-5 (H), Bacteria many, Amorphous crystals many, Mucous present, Squamous Epithelial Cells/HPF moderate otherwise WNL     Pelvic ultrasound: Single live intrauterine gestation with an estimated gestational age of 14 weeks.  Appendix ultrasound: Appendix not identified. No secondary signs of appendicitis. Appendicitis cannot be excluded.    Review of Systems   Constitutional: Negative for chills and fever.   Gastrointestinal: Positive for abdominal pain. Negative for diarrhea, nausea and vomiting.   Genitourinary: Negative for dysuria, flank pain and vaginal bleeding.   All other systems reviewed and are negative.    Allergies:  No known drug  allergies.     Medications:  Keflex    Past Medical History:    Patient denies any significant past medical history.      Social History:  Presents to the ED with her .  Lives in [place], here visiting family    Physical Exam     Patient Vitals for the past 24 hrs:   BP Temp Temp src Pulse Resp SpO2 Weight   03/24/21 0417 -- -- -- 116 20 99 % --   03/24/21 0415 -- -- -- 114 20 99 % --   03/24/21 0303 -- -- -- 101 25 98 % --   03/24/21 0246 110/55 -- -- 105 -- 98 % --   03/24/21 0242 -- -- -- 105 26 98 % --   03/24/21 0143 -- -- -- 109 15 100 % --   03/24/21 0140 (!) 116/90 -- -- 102 25 -- --   03/24/21 0045 118/70 -- -- 111 16 100 % --   03/24/21 0043 -- -- -- 108 17 100 % --   03/24/21 0024 -- -- -- 105 11 100 % --   03/24/21 0015 111/64 -- -- 101 -- 100 % --   03/23/21 2352 113/56 98.2  F (36.8  C) Oral 104 16 98 % 86.2 kg (190 lb)       Physical Exam  General: Appears well-developed and well-nourished.   Head: No signs of trauma.   CV: mild tachycardia and regular rhythm.    Resp: Effort normal and breath sounds normal. No respiratory distress.   GI: Soft. There is RLQ tenderness.   Normal bowel sounds.  No CVA tenderness.  MSK: Normal range of motion.  Neuro: The patient is alert and oriented. Speech normal.  Skin: Skin is warm and dry. No rash noted.   Psych: normal mood and affect. behavior is normal.       Emergency Department Course     Imaging:  Obstetric ultrasound >14 weeks:  1.  18.3 cm echogenic mass with calcification in the right adnexal region. The right ovary is not seen as a separate structure. This could be a mass of the right ovary such as ovarian dermoid. No blood flow detected in this structure. Right ovarian mass with torsion not excluded.  2.  Single live intrauterine pregnancy.  Report per radiology.     Appendix ultrasound:  The appendix was not sonographically visualized. Free fluid in the right lower quadrant.  Report per radiology.     Laboratory:   CBC: WBC 10.8, HGB 9.4 (L),       CMP: Na 132 (L), K 3.3 (L), Glucose 107 (H), BUN 5 (L), Albumin 3.3 (L), otherwise WNL (Creatinine 0.55)     UA: Slightly cloudy straw colored urine, Ketones 40, Leukocyte Esterase small,WBC/HPF 10 (H), RBC/HPF 3 (H), Bacteria many, Squamous Epithelial Cells/HPF 3 (H), otherwise WNL     Asymptomatic COVID19 Virus PCR, nasopharyngeal: negative    Emergency Department Course:  Reviewed:  I reviewed nursing notes, vitals and past medical history    Assessments:  (0028) I obtained history and examined the patient as noted above.   (0227) I rechecked the patient and explained findings.   (0242) Patient updated on plan.    Consults:  (0207) I discussed the patient's obstetric ultrasound with Dr. Leija of radiology.  (0240) I spoke with Dr. Emmanuel Soto of ob/gyn regarding the patient.  (0335) I discussed the patient with Dr. Soto on his arrival in the ED.     Interventions:  (0017) Normal Saline, 1 liter, IV bolus   (0043) Morphine, 4 mg, IV injection   (0206) Morphine, 4 mg, IV injection     Disposition:  The patient was transferred to the OR under the care of Dr. Soto.    Impression & Plan     Medical Decision Making:  Jesus Garcia is a 26-year-old woman who presents due to right lower quadrant abdominal pain.  She is approximately 14 weeks pregnant.  She has had the pain for a few days and actually been seen at home in [place] on Sunday for this.  I was able to review the work-up including blood work, urine, ultrasounds.  At that time there was concern for possible UTI and the patient was started on antibiotic.  Given the continued pain she presents to the ER here.  On my evaluation she did have tenderness to the right lower quadrant.  My initial concern was for the possibility of appendicitis as the appendix had not been visualized on ultrasound at the prior visit.  Blood work was repeated and ultrasound was also obtained.  Ultrasound tonight showed 18 cm pelvic mass concerning for  possible dermoid.  There is no mention of a mass outside hospital ultrasound.  I did discuss the case with OB, Dr. Soto.  He evaluated the patient in the ER and after discussion of options it was ultimately decided to take the patient to the operating room.    Covid-19  Jesus Garcia was evaluated during a global COVID-19 pandemic, which necessitated consideration that the patient might be at risk for infection with the SARS-CoV-2 virus that causes COVID-19.   Applicable protocols for evaluation were followed during the patient's care.   COVID-19 was considered as part of the patient's evaluation. The plan for testing is:  a test was obtained during this visit.    Diagnosis:    ICD-10-CM    1. Pelvic mass  R19.00          Scribe Disclosure:  I, Hope Ivan, am serving as a scribe at 12:28 AM on 3/24/2021 to document services personally performed by Toy Fink MD based on my observations and the provider's statements to me.         Toy Fink MD  03/24/21 8223

## 2021-03-24 NOTE — ED TRIAGE NOTES
pt is 14 weeks pregnant - c/o sharp rlq pain x2 days. pt reports vomiting once on sunday but no nausea or vomiting since. Pt started on keflex Sunday for uti

## 2021-03-24 NOTE — OP NOTE
[facility] Gynecology Brief Operative Note    Pre-operative diagnosis: 14 week gestation  Pelvic mass  Acute pelvic pain   Post-operative diagnosis: Torsed right ovarian dermoid   Procedure: Laparotomy with RSO   Surgeon: Emmanuel Soto MD   Assistant(s): None   Anesthesia: General Endotracheal Anesthesia   Estimated blood loss: less than 50ml   Total IV fluids: (See anesthesia record)   Blood transfusion: No transfusion was given during surgery   Total urine output: (See anesthesia record)   Drains: None   Specimens: Right tube and ovary   Findings: Volleyball sized ovarian dermoid with torsion x 2   Complications: None   Condition: Stable   Comments: See dictated operative report for full details

## 2021-03-24 NOTE — PLAN OF CARE
"Patient being seen in ED for \"stomach pain.\" States this is her first pregnancy and she is getting regular care in [place] where she lives. Denies any cramping/contractions, leaking of fluid, or bleeding. Attempted to obtain FHR with doppler. Unable to find heart tones. Will notify ED staff.  "

## 2021-03-25 ENCOUNTER — APPOINTMENT (OUTPATIENT)
Dept: ULTRASOUND IMAGING | Facility: CLINIC | Age: 26
DRG: 818 | End: 2021-03-25
Attending: OBSTETRICS & GYNECOLOGY
Payer: COMMERCIAL

## 2021-03-25 LAB — GLUCOSE BLDC GLUCOMTR-MCNC: 99 MG/DL (ref 70–99)

## 2021-03-25 PROCEDURE — 76801 OB US < 14 WKS SINGLE FETUS: CPT

## 2021-03-25 PROCEDURE — 120N000001 HC R&B MED SURG/OB

## 2021-03-25 PROCEDURE — 250N000013 HC RX MED GY IP 250 OP 250 PS 637: Performed by: OBSTETRICS & GYNECOLOGY

## 2021-03-25 PROCEDURE — 999N001017 HC STATISTIC GLUCOSE BY METER IP

## 2021-03-25 RX ADMIN — SENNOSIDES AND DOCUSATE SODIUM 1 TABLET: 8.6; 5 TABLET ORAL at 08:14

## 2021-03-25 RX ADMIN — ACETAMINOPHEN 975 MG: 325 TABLET, FILM COATED ORAL at 13:18

## 2021-03-25 RX ADMIN — OXYCODONE HYDROCHLORIDE 10 MG: 5 TABLET ORAL at 09:46

## 2021-03-25 RX ADMIN — OXYCODONE HYDROCHLORIDE 5 MG: 5 TABLET ORAL at 16:10

## 2021-03-25 RX ADMIN — ACETAMINOPHEN 975 MG: 325 TABLET, FILM COATED ORAL at 20:15

## 2021-03-25 RX ADMIN — HYDROXYZINE HYDROCHLORIDE 25 MG: 25 TABLET, FILM COATED ORAL at 17:42

## 2021-03-25 RX ADMIN — ACETAMINOPHEN 975 MG: 325 TABLET, FILM COATED ORAL at 08:13

## 2021-03-25 RX ADMIN — OXYCODONE HYDROCHLORIDE 5 MG: 5 TABLET ORAL at 20:15

## 2021-03-25 RX ADMIN — SENNOSIDES AND DOCUSATE SODIUM 1 TABLET: 8.6; 5 TABLET ORAL at 20:15

## 2021-03-25 RX ADMIN — ACETAMINOPHEN 975 MG: 325 TABLET, FILM COATED ORAL at 01:06

## 2021-03-25 RX ADMIN — OXYCODONE HYDROCHLORIDE 10 MG: 5 TABLET ORAL at 05:46

## 2021-03-25 ASSESSMENT — ACTIVITIES OF DAILY LIVING (ADL)
ADLS_ACUITY_SCORE: 16

## 2021-03-25 NOTE — PLAN OF CARE
VSS. A/O. SBA. Abdo incision JUAN DIEGO. Dilaudid/tylenol/oxy given. Tolerating diet. Voiding fine post eaton removal. -bs/gas. No vaginal bleed.

## 2021-03-25 NOTE — PROGRESS NOTES
"Jesus Garcia  3/25/2021    Physician notified that fetal heart tones cannot be obtained and that previously a bedside ultrasound had to be performed to confirm fetal viability.  Verbal order given to order bedside ultrasound at some point today to confirm fetal viability.  Per nurse, patient and  requesting MFM consultation due to \"high risk\" pregnancy.  Discussed that at this time there is not an indication for an in-house consultation with MFM.  Will leave it to her OBGYN provider outpatient to order any consultation deemed necessary.    Nina Urban MD  "

## 2021-03-25 NOTE — PLAN OF CARE
Summary: R ovary & fallopian tube removed  Date/Time: 1:32 AM March 25, 2021   Cognitive Concerns/Orientation: A&Ox4  ABNL VS/O2: VSS RA  Cardiac:?WDL  Resp:?LS clear  GI:?BS hypo  Mobility: SBA  Pain Management: sched tylenol, prn oxy/dil  Diet: kosher  Bowel/Bladder: continent, BR  ABNL Labs/BG:   Recent Labs   Lab 03/24/21  0017   *     Drains/Devices:?none  Telemetry Rhythm:  n/a  Skin: incision midline abdomen, JUAN DIEGO  CMS: intact  Consults:    Test Procedures: US determined pregnancy remains viable  Discharge day/Goals/Place: TBD  Other Important Info: pregnant, lives in [place]  History:?History reviewed. No pertinent past medical history.    History reviewed. No pertinent surgical history.

## 2021-03-25 NOTE — PLAN OF CARE
Daily doptones ordered to verify heart tones.  Unable to verify this afternoon, but patient states that this has been the case since her admission.  Dr Urban called regarding patient and orders for a bedside ultrasound to verify heart tones.  Patient and  also requested to ask about an MFM consult for high risk assessment.  Dr Urban does not feel this is necessary at this time but patient is welcome to talk with the rounding OB physician tomorrow.

## 2021-03-25 NOTE — PROGRESS NOTES
Doing well. Ultrasound confirmed heart tones after her procedure. She is urinating without trouble. She has not passed gas yet. Wants to go home tomorrow and we will see if the bowel comes around. Incision has some nearby mottling but otherwise looks great.     AFVSS    A/P1/ Torsion of ovarian dermoid at 13 weeks gestation. Fetal well-being reassuring thus far.No labs or pathology back yet. Continue current cares and hopefully she has enough bowel activity to discharge tomorrow.

## 2021-03-26 ENCOUNTER — APPOINTMENT (OUTPATIENT)
Dept: ULTRASOUND IMAGING | Facility: CLINIC | Age: 26
DRG: 818 | End: 2021-03-26
Attending: OBSTETRICS & GYNECOLOGY
Payer: COMMERCIAL

## 2021-03-26 VITALS
TEMPERATURE: 98 F | HEART RATE: 89 BPM | WEIGHT: 190 LBS | SYSTOLIC BLOOD PRESSURE: 100 MMHG | OXYGEN SATURATION: 94 % | DIASTOLIC BLOOD PRESSURE: 57 MMHG | RESPIRATION RATE: 15 BRPM

## 2021-03-26 LAB
COPATH REPORT: NORMAL
GLUCOSE BLDC GLUCOMTR-MCNC: 77 MG/DL (ref 70–99)

## 2021-03-26 PROCEDURE — 999N001017 HC STATISTIC GLUCOSE BY METER IP

## 2021-03-26 PROCEDURE — 250N000013 HC RX MED GY IP 250 OP 250 PS 637: Performed by: OBSTETRICS & GYNECOLOGY

## 2021-03-26 PROCEDURE — 76815 OB US LIMITED FETUS(S): CPT

## 2021-03-26 RX ORDER — OXYCODONE HYDROCHLORIDE 5 MG/1
5 TABLET ORAL EVERY 4 HOURS PRN
Qty: 15 TABLET | Refills: 0 | Status: SHIPPED | OUTPATIENT
Start: 2021-03-26

## 2021-03-26 RX ORDER — IBUPROFEN 800 MG/1
800 TABLET, FILM COATED ORAL EVERY 8 HOURS PRN
Qty: 30 TABLET | Refills: 0 | Status: SHIPPED | OUTPATIENT
Start: 2021-03-26

## 2021-03-26 RX ADMIN — ACETAMINOPHEN 975 MG: 325 TABLET, FILM COATED ORAL at 02:34

## 2021-03-26 RX ADMIN — ACETAMINOPHEN 975 MG: 325 TABLET, FILM COATED ORAL at 08:49

## 2021-03-26 RX ADMIN — OXYCODONE HYDROCHLORIDE 5 MG: 5 TABLET ORAL at 00:24

## 2021-03-26 RX ADMIN — SENNOSIDES AND DOCUSATE SODIUM 1 TABLET: 8.6; 5 TABLET ORAL at 08:49

## 2021-03-26 RX ADMIN — OXYCODONE HYDROCHLORIDE 5 MG: 5 TABLET ORAL at 06:08

## 2021-03-26 ASSESSMENT — ACTIVITIES OF DAILY LIVING (ADL)
ADLS_ACUITY_SCORE: 16

## 2021-03-26 NOTE — PROGRESS NOTES
[facility]       DAILY NOTE - POSToperative DAY 2     SUBJECTIVE:     Pain controlled? Yes  Tolerating a regular diet? YES  Ambulating? YES  Voiding without difficulty? Yes    OBJECTIVE:  Vitals:    03/25/21 1533 03/25/21 2100 03/26/21 0028 03/26/21 0743   BP: 106/59 104/61 109/59 100/57   BP Location: Left arm Left arm Right arm Left arm   Pulse: 93 98 95 89   Resp: 16 16 15    Temp: 98.4  F (36.9  C) 98.8  F (37.1  C) 98.1  F (36.7  C) 98  F (36.7  C)   TempSrc: Oral Oral Oral Oral   SpO2: 95% 98% 95% 94%   Weight:           Constitutional: healthy, alert and no distress    Abdomen:  Soft, appropriately tender     Incision: Healing well      LABS:  Hemoglobin   Date Value Ref Range Status   03/24/2021 9.4 (L) 11.7 - 15.7 g/dL Final       ASSESSMENT:  Post-operative day #2 s/p exploratory laparotomy with RSO  Doing well.       PLAN:   Discharge today.  Return to clinic in 1week.     Emmanuel Soto MD

## 2021-03-26 NOTE — PLAN OF CARE
Alert and oriented x4. VSS. LS clear. BS audible, passing gas. Abdominal incision with erythema, with marked from previous shift no change.SBA and stable with gait. Ambulates to the bathroom. Pain controlled with tylenol and PRN oxycodone. Tolerating diet. No vaginal bleeding reported. Slept poorly.

## 2021-03-26 NOTE — PLAN OF CARE
A&Ox4. VSS. LS clear. BS hypoactive, no flatus. Abdominal incision with erythema, marked no change, MD aware. Fetal heart tones checked by ultrasound. IND. Pain controlled with tylenol, oxycodone, and atarax. Tolerating diet. Voiding. No vaginal bleeding.

## 2021-03-27 NOTE — OP NOTE
Procedure Date: 03/24/2021      PREOPERATIVE DIAGNOSES:   1.  A 14-week intrauterine pregnancy.   2.  Acute pelvic pain.   3.  Pelvic mass.      POSTOPERATIVE DIAGNOSES:     1.  Right ovarian dermoid.   2.  Right ovarian torsion.      PROCEDURE PERFORMED:  Vertical laparotomy with right salpingo-oophorectomy.      SURGEON:  Emmanuel Soto MD      ANESTHESIA:  General.      ESTIMATED BLOOD LOSS:  20 mL.      DRAINS:  Pardo.      SPECIMENS:  Right tube and ovary.      COMPLICATIONS:  None.      FINDINGS:  She had a volleyball-sized ovarian dermoid that was ruptured intraoperatively.      TECHNIQUE:  The patient was taken to the operating room.  She was placed on the operating table in a dorsal supine position.  She was placed under general anesthesia without difficulty.  She was then prepped and draped in a dorsal supine position.  First, a timeout was performed.  We made a vertical laparotomy incision from her umbilicus to her symphysis pubis with a scalpel blade.  This was cut down to the rectus fascia.  We scored the rectus fascia at the level of the linea alba, splitting the rectus abdominis muscles.  We then bluntly dissected into the peritoneal cavity.  We used a Serrano scissors to incise superiorly and inferiorly the fascia and peritoneum.  The gravid uterus was visible but there was no pelvic mass to feel.  In the right upper quadrant, you could feel what appeared to be the size of a volleyball-sized mass.  Following this down to the pelvis, you could palpate the torsion, and there were at least 2-3 complete revolutions of ovarian torsion present.  The ovary was necrotic and hemorrhagic.  The tube was necrosed, and the fimbria were not visible due to the necrosis present.  The cyst was so large that it was immobilized due to its size.  It was not adhesed to anything.  It was just simply stuck in the right upper quadrant, and I could not manipulate it down into the right lower quadrant, based purely on the  size.  This would have required extending the incision above the umbilicus, nearly up to the sternum to be able to deliver the mass without rupturing, and looking at the cyst and the fact that it was a torsion, it did seem somewhat cystic in nature.  Ultrasound had suggested it might be a dermoid, so we did a cystotomy, which then poured out large volumes of sebaceous fluid along with abundant hair, confirming that this was in fact an ovarian dermoid cyst.  After we drained enough fluid out of it to then have it mobile enough, we were able to deliver it through the incision, and at this point, it was still the size of a smaller-sized football.  We were able to then visualize the IP ligament.  We placed Randy clamps across the IP ligament and suture ligated this with an 0 Vicryl suture x 2.  At this point, we had still the large amount of the sebaceous cystic fluid in the peritoneal cavity.  We irrigated this with 3 liters of irrigation and cleaned out as much as we could.  We inspected the pedicle for hemostasis, and hemostasis was intact.  At this point, the procedure was complete.  We then closed the fascia with a 0 PDS in a running fashion, and copious irrigation was performed.  Subcutaneous fat was closed with a 3-0 Vicryl in a running fashion, and the skin was closed with a 4-0 Vicryl.  We then used dermabond surgical glue to glue the incision closed, and the procedure was complete.  At this point, sponge, lap and needle counts were correct x 2.  The patient was then taken to postop recovery in stable condition.         MARIZOL PATEL MD             D: 2021   T: 2021   MT: JAMIN      Name:     UMAIR BECK   MRN:      [id number]        Account:        [id number]   :      1995           Procedure Date: 2021      Document: [id number]

## 2021-04-26 NOTE — DISCHARGE SUMMARY
Service Date: 2021  Discharge Date: 2021    DIAGNOSES:    1.  A 14-week gestation.  2.  Pelvic mass.  3.  Acute pelvic pain.    POSTOPERATIVE DIAGNOSIS:  Torsed right ovarian dermoid.    PROCEDURE PERFORMED:  Laparotomy with right salpingo-oophorectomy.    HOSPITALIZATION COURSE:  Surgical procedure was done per the operative note.  She was taken to postoperative recovery in stable condition.  She was transferred to her postoperative bed where she remained for 2 days from her hospitalization.  During her hospitalization, she began passing flatus and was advanced to regular diet without difficulty.  During her hospitalization, she remained afebrile, vitals remained stable.  Abdomen:  Soft, nontender.  Incision remained clean, dry and intact.  She was found to be stable for discharge on 2021.      DISCHARGE INSTRUCTIONS:  She was given ibuprofen and Norco to take as indicated for pain.  She is to follow up in 2 weeks for an incision check.  She is to call if she has a temperature of 100.4, worsening abdominal pain, incision redness, purulent discharge, heavy vaginal bleeding.  Her discharge hemoglobin was 9.4.       FINAL DIAGNOSES:  1.  14-week gestation.  2.  Pelvic mass.  3.  Acute pelvic pain.  4.  Torsed right ovarian dermoid.  5.  Laparotomy with right salpingo-oophorectomy.  6.  Acute blood loss anemia, asymptomatic.    Emmanuel Soto MD        D: 2021   T: 2021   MT: CANDELARIA    Name:     UMAIR BECK  MRN:      [id number]        Account:      [id number]   :      1995           Service Date: 2021                                  Discharge Date: 2021     Document: [id number]

## 2021-05-02 ENCOUNTER — HEALTH MAINTENANCE LETTER (OUTPATIENT)
Age: 26
End: 2021-05-02

## 2021-10-17 ENCOUNTER — HEALTH MAINTENANCE LETTER (OUTPATIENT)
Age: 26
End: 2021-10-17

## 2022-05-29 ENCOUNTER — HEALTH MAINTENANCE LETTER (OUTPATIENT)
Age: 27
End: 2022-05-29

## 2022-10-02 ENCOUNTER — HEALTH MAINTENANCE LETTER (OUTPATIENT)
Age: 27
End: 2022-10-02

## 2023-06-04 ENCOUNTER — HEALTH MAINTENANCE LETTER (OUTPATIENT)
Age: 28
End: 2023-06-04

## (undated) DEVICE — CATH TRAY FOLEY SURESTEP 16FR WDRAIN BAG STLK LATEX A300316A

## (undated) DEVICE — SPONGE LAP 18X18" 23250-400

## (undated) DEVICE — SOL WATER IRRIG 1000ML BOTTLE 2F7114

## (undated) DEVICE — SU PDS II 0 CT 36" Z358T

## (undated) DEVICE — SOL NACL 0.9% IRRIG 1000ML BOTTLE 2F7124

## (undated) DEVICE — LINEN TOWEL PACK X5 5464

## (undated) DEVICE — MANIFOLD NEPTUNE 4 PORT 700-20

## (undated) DEVICE — GOWN IMPERVIOUS SPECIALTY XLG/XLONG 32474

## (undated) DEVICE — Device

## (undated) DEVICE — SU VICRYL 0 CT-1 27" J340H

## (undated) DEVICE — ESU GROUND PAD UNIVERSAL W/O CORD

## (undated) DEVICE — GLOVE PROTEXIS W/NEU-THERA 8.0  2D73TE80

## (undated) DEVICE — KIT ABDOMINAL HYST SMA15AHFSM

## (undated) RX ORDER — PROPOFOL 10 MG/ML
INJECTION, EMULSION INTRAVENOUS
Status: DISPENSED
Start: 2021-03-24

## (undated) RX ORDER — FENTANYL CITRATE 0.05 MG/ML
INJECTION, SOLUTION INTRAMUSCULAR; INTRAVENOUS
Status: DISPENSED
Start: 2021-03-24

## (undated) RX ORDER — HYDROMORPHONE HYDROCHLORIDE 1 MG/ML
INJECTION, SOLUTION INTRAMUSCULAR; INTRAVENOUS; SUBCUTANEOUS
Status: DISPENSED
Start: 2021-03-24

## (undated) RX ORDER — GLYCOPYRROLATE 0.2 MG/ML
INJECTION, SOLUTION INTRAMUSCULAR; INTRAVENOUS
Status: DISPENSED
Start: 2021-03-24

## (undated) RX ORDER — FENTANYL CITRATE 50 UG/ML
INJECTION, SOLUTION INTRAMUSCULAR; INTRAVENOUS
Status: DISPENSED
Start: 2021-03-24

## (undated) RX ORDER — ONDANSETRON 2 MG/ML
INJECTION INTRAMUSCULAR; INTRAVENOUS
Status: DISPENSED
Start: 2021-03-24

## (undated) RX ORDER — DOXYCYCLINE 100 MG/10ML
INJECTION, POWDER, LYOPHILIZED, FOR SOLUTION INTRAVENOUS
Status: DISPENSED
Start: 2021-03-24

## (undated) RX ORDER — BUPIVACAINE HYDROCHLORIDE AND EPINEPHRINE 5; 5 MG/ML; UG/ML
INJECTION, SOLUTION EPIDURAL; INTRACAUDAL; PERINEURAL
Status: DISPENSED
Start: 2021-03-24

## (undated) RX ORDER — NEOSTIGMINE METHYLSULFATE 1 MG/ML
VIAL (ML) INJECTION
Status: DISPENSED
Start: 2021-03-24